# Patient Record
Sex: FEMALE | Race: WHITE | NOT HISPANIC OR LATINO | URBAN - METROPOLITAN AREA
[De-identification: names, ages, dates, MRNs, and addresses within clinical notes are randomized per-mention and may not be internally consistent; named-entity substitution may affect disease eponyms.]

---

## 2019-06-01 ENCOUNTER — EMERGENCY (EMERGENCY)
Facility: HOSPITAL | Age: 44
LOS: 1 days | Discharge: HOME | End: 2019-06-01
Attending: EMERGENCY MEDICINE | Admitting: EMERGENCY MEDICINE
Payer: MEDICAID

## 2019-06-01 VITALS
WEIGHT: 162.04 LBS | OXYGEN SATURATION: 97 % | RESPIRATION RATE: 16 BRPM | TEMPERATURE: 98 F | SYSTOLIC BLOOD PRESSURE: 111 MMHG | HEIGHT: 64 IN | HEART RATE: 77 BPM | DIASTOLIC BLOOD PRESSURE: 80 MMHG

## 2019-06-01 DIAGNOSIS — R05 COUGH: ICD-10-CM

## 2019-06-01 DIAGNOSIS — J40 BRONCHITIS, NOT SPECIFIED AS ACUTE OR CHRONIC: ICD-10-CM

## 2019-06-01 PROCEDURE — 71046 X-RAY EXAM CHEST 2 VIEWS: CPT | Mod: 26

## 2019-06-01 PROCEDURE — 99283 EMERGENCY DEPT VISIT LOW MDM: CPT

## 2019-06-01 RX ORDER — AZITHROMYCIN 500 MG/1
500 TABLET, FILM COATED ORAL ONCE
Refills: 0 | Status: COMPLETED | OUTPATIENT
Start: 2019-06-01 | End: 2019-06-01

## 2019-06-01 RX ORDER — AZITHROMYCIN 500 MG/1
1 TABLET, FILM COATED ORAL
Qty: 4 | Refills: 0
Start: 2019-06-01 | End: 2019-06-04

## 2019-06-01 NOTE — ED PROVIDER NOTE - NSFOLLOWUPINSTRUCTIONS_ED_ALL_ED_FT
take medications as directed     follow up with your doctor as needed     retun to the ER if symptoms worsen

## 2019-06-01 NOTE — ED PROVIDER NOTE - CLINICAL SUMMARY MEDICAL DECISION MAKING FREE TEXT BOX
pt felt generally well throughout ed stay  tolerating po  alert and cooperative and ambulatory dc home with

## 2019-06-01 NOTE — ED ADULT TRIAGE NOTE - CHIEF COMPLAINT QUOTE
walk in c/o unproductive cough x2weeks, denies any other cold symptoms or fever. " the coughing gives me a HA"

## 2019-06-02 VITALS
DIASTOLIC BLOOD PRESSURE: 76 MMHG | RESPIRATION RATE: 15 BRPM | TEMPERATURE: 98 F | SYSTOLIC BLOOD PRESSURE: 110 MMHG | HEART RATE: 80 BPM | OXYGEN SATURATION: 96 %

## 2019-06-02 RX ADMIN — AZITHROMYCIN 500 MILLIGRAM(S): 500 TABLET, FILM COATED ORAL at 00:01

## 2019-06-05 ENCOUNTER — EMERGENCY (EMERGENCY)
Facility: HOSPITAL | Age: 44
LOS: 1 days | Discharge: HOME | End: 2019-06-05
Attending: EMERGENCY MEDICINE | Admitting: EMERGENCY MEDICINE
Payer: MEDICAID

## 2019-06-05 VITALS
RESPIRATION RATE: 18 BRPM | DIASTOLIC BLOOD PRESSURE: 73 MMHG | OXYGEN SATURATION: 97 % | HEART RATE: 68 BPM | WEIGHT: 164.91 LBS | TEMPERATURE: 98 F | SYSTOLIC BLOOD PRESSURE: 113 MMHG

## 2019-06-05 PROCEDURE — 99284 EMERGENCY DEPT VISIT MOD MDM: CPT

## 2019-06-05 RX ORDER — IPRATROPIUM/ALBUTEROL SULFATE 18-103MCG
3 AEROSOL WITH ADAPTER (GRAM) INHALATION
Refills: 0 | Status: COMPLETED | OUTPATIENT
Start: 2019-06-05 | End: 2019-06-05

## 2019-06-05 RX ORDER — CETIRIZINE HYDROCHLORIDE 10 MG/1
1 TABLET ORAL
Qty: 30 | Refills: 0
Start: 2019-06-05 | End: 2019-07-04

## 2019-06-05 RX ORDER — ALBUTEROL 90 UG/1
2 AEROSOL, METERED ORAL
Qty: 1 | Refills: 0
Start: 2019-06-05 | End: 2019-07-04

## 2019-06-05 RX ADMIN — Medication 60 MILLIGRAM(S): at 10:04

## 2019-06-05 RX ADMIN — Medication 3 MILLILITER(S): at 10:01

## 2019-06-05 RX ADMIN — Medication 100 MILLIGRAM(S): at 10:05

## 2019-06-05 NOTE — ED ADULT TRIAGE NOTE - CHIEF COMPLAINT QUOTE
here for productive cough x 2 weeks - was placed on abx with no relief- son is here with same complaints

## 2019-06-05 NOTE — ED PROVIDER NOTE - NSFOLLOWUPINSTRUCTIONS_ED_ALL_ED_FT
Please follow up with your PMD as needed.    I suspect asthma like symptoms from your recent cold (bronchitis).     If symptoms continue consider adding an allergy medication like cetrizine (I have prescribed it).   Return to the ER for increased trouble breathing.     Cough    Coughing is a reflex that clears your throat and your airways. Coughing helps to heal and protect your lungs. It is normal to cough occasionally, but a cough that happens with other symptoms or lasts a long time may be a sign of a condition that needs treatment. Coughing may be caused by infections, asthma or COPD, smoking, postnasal drip, gastroesophageal reflux, as well as other medical conditions. Take medicines only as instructed by your health care provider. Avoid environments or triggers that causes you to cough at work or at home.    SEEK IMMEDIATE MEDICAL CARE IF YOU HAVE ANY OF THE FOLLOWING SYMPTOMS: coughing up blood, shortness of breath, rapid heart rate, chest pain, unexplained weight loss or night sweats.

## 2019-06-05 NOTE — ED PROVIDER NOTE - CLINICAL SUMMARY MEDICAL DECISION MAKING FREE TEXT BOX
Pt presenting with persistent cough following URI. Will give trial of albuterol, prednisone, and Tessalon Perles.

## 2019-06-09 DIAGNOSIS — J40 BRONCHITIS, NOT SPECIFIED AS ACUTE OR CHRONIC: ICD-10-CM

## 2019-06-09 DIAGNOSIS — R05 COUGH: ICD-10-CM

## 2020-08-16 ENCOUNTER — EMERGENCY (EMERGENCY)
Facility: HOSPITAL | Age: 45
LOS: 1 days | Discharge: HOME | End: 2020-08-16
Attending: EMERGENCY MEDICINE | Admitting: EMERGENCY MEDICINE
Payer: MEDICAID

## 2020-08-16 VITALS
RESPIRATION RATE: 19 BRPM | DIASTOLIC BLOOD PRESSURE: 82 MMHG | WEIGHT: 164.91 LBS | OXYGEN SATURATION: 98 % | HEART RATE: 73 BPM | HEIGHT: 64 IN | SYSTOLIC BLOOD PRESSURE: 120 MMHG | TEMPERATURE: 99 F

## 2020-08-16 LAB — S PYO AG SPEC QL IA: NEGATIVE — SIGNIFICANT CHANGE UP

## 2020-08-16 PROCEDURE — 99283 EMERGENCY DEPT VISIT LOW MDM: CPT

## 2020-08-16 RX ORDER — IBUPROFEN 200 MG
600 TABLET ORAL ONCE
Refills: 0 | Status: COMPLETED | OUTPATIENT
Start: 2020-08-16 | End: 2020-08-16

## 2020-08-16 RX ORDER — IBUPROFEN 200 MG
1 TABLET ORAL
Qty: 20 | Refills: 0
Start: 2020-08-16

## 2020-08-16 RX ADMIN — Medication 600 MILLIGRAM(S): at 16:43

## 2020-08-16 NOTE — ED PROVIDER NOTE - PATIENT PORTAL LINK FT
You can access the FollowMyHealth Patient Portal offered by [facility] by registering at the following website: [email or web address]. By joining [employer]’s FollowMyHealth portal, you will also be able to view your health information using other applications (apps) compatible with our system.

## 2020-08-16 NOTE — ED ADULT TRIAGE NOTE - CHIEF COMPLAINT QUOTE
pt c/o L sided sore throat which has radiated to her L ear x1 day. denies fevers, cough, hearing changes

## 2020-08-16 NOTE — ED PROVIDER NOTE - OBJECTIVE STATEMENT
Pt is a 43yo F who reports L sided throat pain that radiates to her L ear.  She reports pain is dull and achy and started yesterday.  She reports it feels like she has an ear infection.  Pain exacerbated by swallowing.  No cough.  No fever or chills.  No lumps/bumps to neck.  Denies other concerns.

## 2020-08-16 NOTE — ED ADULT NURSE NOTE - PMH
Patient called stating she was prescribed for infection in her mouth.  Patient started Augmentin last night.  She states about 4 hours after she started vomiting and had diarrhea.  This morning she took it with food and about 5 hours later vomited.  Patient states she has taken this in the past and does not recall any problems.  Patient is wondering what Dr. Rubio recommends    Call pt [phone]   Sciatica

## 2020-08-16 NOTE — ED PROVIDER NOTE - CLINICAL SUMMARY MEDICAL DECISION MAKING FREE TEXT BOX
Likely viral pharyngitis.  Will send rapid strep to investigate for bacterial possibility.  Motrin for pain.

## 2020-08-16 NOTE — ED PROVIDER NOTE - PHYSICAL EXAMINATION
VITAL SIGNS: I have reviewed nursing notes and confirm.  CONSTITUTIONAL: Well-developed; well-nourished; in no acute distress.  SKIN: Skin is warm and dry, no acute rash.  HEAD: Normocephalic; atraumatic.  EYES: PERRL, EOM intact; conjunctiva and sclera clear.  ENT: No nasal discharge; airway clear without visible tonsils or exudates.  No significant erythema.  TM - clear b/l with normal cone of light.  No erythema.  No tragal tenderness.   NECK: Supple; non tender; no LAD.  CARD: S1, S2 normal; no murmurs, gallops, or rubs. Regular rate and rhythm.  RESP: No wheezes, rales or rhonchi.  ABD: Normal bowel sounds; soft; non-distended; non-tender; no hepatosplenomegaly.  MSK: Normal ROM. No clubbing, cyanosis or edema.  NEURO: Alert, oriented. Grossly unremarkable.  PSYCH: Cooperative, appropriate.

## 2020-08-18 LAB
CULTURE RESULTS: SIGNIFICANT CHANGE UP
SPECIMEN SOURCE: SIGNIFICANT CHANGE UP

## 2020-08-20 DIAGNOSIS — J02.9 ACUTE PHARYNGITIS, UNSPECIFIED: ICD-10-CM

## 2021-10-28 NOTE — ED PROVIDER NOTE - OBJECTIVE STATEMENT
Pt called and stated he has a prescription need that his wellness prescription plan doesn't cover. Pt also stated that [employer] will be faxing a request for a medical prescription exeemption over. Please advise. 42 y/o female with no pertinent PMHx, NKDA, presents to the ED c/o URI with persistent cough x 2 weeks. Pt describes her cough as moist and hacking, worse at night. She states she has been alternating between Mucinex and Robitussin with some relief of her symptoms. Pt was in the ER x 3 days ago where she was prescribed a Z aissatou which she is finishing today. Pt states the Z aissatou has not improved her symptoms at all. She denies fever/chills, no other medical complaints at this time.

## 2022-04-07 ENCOUNTER — EMERGENCY (EMERGENCY)
Facility: HOSPITAL | Age: 47
LOS: 1 days | Discharge: HOME | End: 2022-04-07
Admitting: EMERGENCY MEDICINE
Payer: MEDICAID

## 2022-04-07 VITALS
DIASTOLIC BLOOD PRESSURE: 85 MMHG | OXYGEN SATURATION: 94 % | WEIGHT: 164.91 LBS | HEART RATE: 75 BPM | HEIGHT: 64 IN | SYSTOLIC BLOOD PRESSURE: 130 MMHG | RESPIRATION RATE: 18 BRPM | TEMPERATURE: 99 F

## 2022-04-07 VITALS
TEMPERATURE: 98 F | DIASTOLIC BLOOD PRESSURE: 72 MMHG | HEART RATE: 66 BPM | RESPIRATION RATE: 18 BRPM | SYSTOLIC BLOOD PRESSURE: 108 MMHG | OXYGEN SATURATION: 98 %

## 2022-04-07 DIAGNOSIS — U07.1 COVID-19: ICD-10-CM

## 2022-04-07 DIAGNOSIS — J06.9 ACUTE UPPER RESPIRATORY INFECTION, UNSPECIFIED: ICD-10-CM

## 2022-04-07 DIAGNOSIS — N39.0 URINARY TRACT INFECTION, SITE NOT SPECIFIED: ICD-10-CM

## 2022-04-07 DIAGNOSIS — R10.9 UNSPECIFIED ABDOMINAL PAIN: ICD-10-CM

## 2022-04-07 LAB
ALBUMIN SERPL ELPH-MCNC: 3.8 G/DL — SIGNIFICANT CHANGE UP (ref 3.4–5)
ALP SERPL-CCNC: 197 U/L — HIGH (ref 40–120)
ALT FLD-CCNC: 385 U/L — HIGH (ref 12–42)
ANION GAP SERPL CALC-SCNC: 7 MMOL/L — LOW (ref 9–16)
APPEARANCE UR: CLEAR — SIGNIFICANT CHANGE UP
APPEARANCE UR: CLEAR — SIGNIFICANT CHANGE UP
APTT BLD: 35.4 SEC — SIGNIFICANT CHANGE UP (ref 27.5–35.5)
AST SERPL-CCNC: 142 U/L — HIGH (ref 15–37)
BACTERIA # UR AUTO: PRESENT /HPF
BACTERIA # UR AUTO: PRESENT /HPF
BASOPHILS # BLD AUTO: 0.04 K/UL — SIGNIFICANT CHANGE UP (ref 0–0.2)
BASOPHILS NFR BLD AUTO: 0.5 % — SIGNIFICANT CHANGE UP (ref 0–2)
BILIRUB SERPL-MCNC: 2 MG/DL — HIGH (ref 0.2–1.2)
BILIRUB UR-MCNC: ABNORMAL
BILIRUB UR-MCNC: ABNORMAL
BUN SERPL-MCNC: 12 MG/DL — SIGNIFICANT CHANGE UP (ref 7–23)
CALCIUM SERPL-MCNC: 9 MG/DL — SIGNIFICANT CHANGE UP (ref 8.5–10.5)
CHLORIDE SERPL-SCNC: 104 MMOL/L — SIGNIFICANT CHANGE UP (ref 96–108)
CO2 SERPL-SCNC: 30 MMOL/L — SIGNIFICANT CHANGE UP (ref 22–31)
COD CRY URNS QL: ABNORMAL /HPF
COLOR SPEC: YELLOW — SIGNIFICANT CHANGE UP
COLOR SPEC: YELLOW — SIGNIFICANT CHANGE UP
CREAT SERPL-MCNC: 0.87 MG/DL — SIGNIFICANT CHANGE UP (ref 0.5–1.3)
DIFF PNL FLD: NEGATIVE — SIGNIFICANT CHANGE UP
DIFF PNL FLD: NEGATIVE — SIGNIFICANT CHANGE UP
EGFR: 83 ML/MIN/1.73M2 — SIGNIFICANT CHANGE UP
EOSINOPHIL # BLD AUTO: 0.33 K/UL — SIGNIFICANT CHANGE UP (ref 0–0.5)
EOSINOPHIL NFR BLD AUTO: 4.3 % — SIGNIFICANT CHANGE UP (ref 0–6)
EPI CELLS # UR: ABNORMAL /HPF (ref 0–5)
EPI CELLS # UR: ABNORMAL /HPF (ref 0–5)
GLUCOSE SERPL-MCNC: 143 MG/DL — HIGH (ref 70–99)
GLUCOSE UR QL: NEGATIVE — SIGNIFICANT CHANGE UP
GLUCOSE UR QL: NEGATIVE — SIGNIFICANT CHANGE UP
HCT VFR BLD CALC: 42.1 % — SIGNIFICANT CHANGE UP (ref 34.5–45)
HGB BLD-MCNC: 14.2 G/DL — SIGNIFICANT CHANGE UP (ref 11.5–15.5)
IMM GRANULOCYTES NFR BLD AUTO: 0.3 % — SIGNIFICANT CHANGE UP (ref 0–1.5)
INR BLD: 1.01 — SIGNIFICANT CHANGE UP (ref 0.88–1.16)
KETONES UR-MCNC: ABNORMAL MG/DL
KETONES UR-MCNC: ABNORMAL MG/DL
LEUKOCYTE ESTERASE UR-ACNC: ABNORMAL
LEUKOCYTE ESTERASE UR-ACNC: ABNORMAL
LYMPHOCYTES # BLD AUTO: 0.91 K/UL — LOW (ref 1–3.3)
LYMPHOCYTES # BLD AUTO: 11.9 % — LOW (ref 13–44)
MAGNESIUM SERPL-MCNC: 2 MG/DL — SIGNIFICANT CHANGE UP (ref 1.6–2.6)
MCHC RBC-ENTMCNC: 31.1 PG — SIGNIFICANT CHANGE UP (ref 27–34)
MCHC RBC-ENTMCNC: 33.7 GM/DL — SIGNIFICANT CHANGE UP (ref 32–36)
MCV RBC AUTO: 92.3 FL — SIGNIFICANT CHANGE UP (ref 80–100)
MONOCYTES # BLD AUTO: 0.89 K/UL — SIGNIFICANT CHANGE UP (ref 0–0.9)
MONOCYTES NFR BLD AUTO: 11.6 % — SIGNIFICANT CHANGE UP (ref 2–14)
NEUTROPHILS # BLD AUTO: 5.47 K/UL — SIGNIFICANT CHANGE UP (ref 1.8–7.4)
NEUTROPHILS NFR BLD AUTO: 71.4 % — SIGNIFICANT CHANGE UP (ref 43–77)
NITRITE UR-MCNC: NEGATIVE — SIGNIFICANT CHANGE UP
NITRITE UR-MCNC: NEGATIVE — SIGNIFICANT CHANGE UP
NRBC # BLD: 0 /100 WBCS — SIGNIFICANT CHANGE UP (ref 0–0)
PH UR: 6.5 — SIGNIFICANT CHANGE UP (ref 5–8)
PH UR: 6.5 — SIGNIFICANT CHANGE UP (ref 5–8)
PLATELET # BLD AUTO: 230 K/UL — SIGNIFICANT CHANGE UP (ref 150–400)
POTASSIUM SERPL-MCNC: 4 MMOL/L — SIGNIFICANT CHANGE UP (ref 3.5–5.3)
POTASSIUM SERPL-SCNC: 4 MMOL/L — SIGNIFICANT CHANGE UP (ref 3.5–5.3)
PROT SERPL-MCNC: 7.3 G/DL — SIGNIFICANT CHANGE UP (ref 6.4–8.2)
PROT UR-MCNC: ABNORMAL MG/DL
PROT UR-MCNC: ABNORMAL MG/DL
PROTHROM AB SERPL-ACNC: 11.7 SEC — SIGNIFICANT CHANGE UP (ref 10.5–13.4)
RAPID RVP RESULT: DETECTED
RBC # BLD: 4.56 M/UL — SIGNIFICANT CHANGE UP (ref 3.8–5.2)
RBC # FLD: 13.4 % — SIGNIFICANT CHANGE UP (ref 10.3–14.5)
RBC CASTS # UR COMP ASSIST: < 5 /HPF — SIGNIFICANT CHANGE UP
RBC CASTS # UR COMP ASSIST: < 5 /HPF — SIGNIFICANT CHANGE UP
SARS-COV-2 RNA SPEC QL NAA+PROBE: DETECTED
SODIUM SERPL-SCNC: 141 MMOL/L — SIGNIFICANT CHANGE UP (ref 132–145)
SP GR SPEC: 1.02 — SIGNIFICANT CHANGE UP (ref 1–1.03)
SP GR SPEC: >=1.03 — SIGNIFICANT CHANGE UP (ref 1–1.03)
UROBILINOGEN FLD QL: 4 E.U./DL
UROBILINOGEN FLD QL: 4 E.U./DL
WBC # BLD: 7.66 K/UL — SIGNIFICANT CHANGE UP (ref 3.8–10.5)
WBC # FLD AUTO: 7.66 K/UL — SIGNIFICANT CHANGE UP (ref 3.8–10.5)
WBC UR QL: ABNORMAL /HPF
WBC UR QL: ABNORMAL /HPF

## 2022-04-07 PROCEDURE — 99284 EMERGENCY DEPT VISIT MOD MDM: CPT

## 2022-04-07 RX ORDER — CEFUROXIME AXETIL 250 MG
250 TABLET ORAL EVERY 12 HOURS
Refills: 0 | Status: DISCONTINUED | OUTPATIENT
Start: 2022-04-07 | End: 2022-04-11

## 2022-04-07 RX ORDER — KETOROLAC TROMETHAMINE 30 MG/ML
15 SYRINGE (ML) INJECTION ONCE
Refills: 0 | Status: DISCONTINUED | OUTPATIENT
Start: 2022-04-07 | End: 2022-04-07

## 2022-04-07 RX ORDER — SODIUM CHLORIDE 9 MG/ML
1000 INJECTION INTRAMUSCULAR; INTRAVENOUS; SUBCUTANEOUS ONCE
Refills: 0 | Status: COMPLETED | OUTPATIENT
Start: 2022-04-07 | End: 2022-04-07

## 2022-04-07 RX ORDER — CEFUROXIME AXETIL 250 MG
1 TABLET ORAL
Qty: 20 | Refills: 0
Start: 2022-04-07 | End: 2022-04-16

## 2022-04-07 RX ADMIN — SODIUM CHLORIDE 1000 MILLILITER(S): 9 INJECTION INTRAMUSCULAR; INTRAVENOUS; SUBCUTANEOUS at 18:28

## 2022-04-07 RX ADMIN — Medication 250 MILLIGRAM(S): at 18:21

## 2022-04-07 RX ADMIN — Medication 200 MILLIGRAM(S): at 16:01

## 2022-04-07 RX ADMIN — SODIUM CHLORIDE 1000 MILLILITER(S): 9 INJECTION INTRAMUSCULAR; INTRAVENOUS; SUBCUTANEOUS at 16:01

## 2022-04-07 RX ADMIN — Medication 15 MILLIGRAM(S): at 16:00

## 2022-04-07 NOTE — ED PROVIDER NOTE - NSFOLLOWUPINSTRUCTIONS_ED_ALL_ED_FT
Viral Respiratory Infection    A viral respiratory infection is an illness that affects parts of the body used for breathing, like the lungs, nose, and throat. It is caused by a germ called a virus. Symptoms can include runny nose, coughing, sneezing, fatigue, body aches, sore throat, fever, or headache. Over the counter medicine can be used to manage the symptoms but the infection typically goes away on its own in 5 to 10 days.     SEEK IMMEDIATE MEDICAL CARE IF YOU HAVE ANY OF THE FOLLOWING SYMPTOMS: shortness of breath, chest pain, fever over 10 days, or lightheadedness/dizziness.         Urinary Tract Infection    A urinary tract infection (UTI) is an infection of any part of the urinary tract, which includes the kidneys, ureters, bladder, and urethra. Risk factors include ignoring your need to urinate, wiping back to front if female, being an uncircumcised male, and having diabetes or a weak immune system. Symptoms include frequent urination, pain or burning with urination, foul smelling urine, cloudy urine, pain in the lower abdomen, blood in the urine, and fever. If you were prescribed an antibiotic medicine, take it as told by your health care provider. Do not stop taking the antibiotic even if you start to feel better.    SEEK IMMEDIATE MEDICAL CARE IF YOU HAVE ANY OF THE FOLLOWING SYMPTOMS: severe back or abdominal pain, fever, inability to keep fluids or medicine down, dizziness/lightheadedness, or a change in mental status.     TAKE ANTIBIOTICS AS PRESCRIBED UNTIL COMPLETE.   TAKE OVER THE COUNTER COLD MEDICATIONS FOR SYMPTOMATIC TREATMENT    -PLEASE FOLLOW-UP WITH YOUR PRIMARY CARE DOCTOR IN 1-2 DAYS.  BRING ALL PAPERWORK FROM TODAY'S VISIT TO YOUR FOLLOW-UP VISIT.  IF YOU DO NOT HAVE A PRIMARY CARE DOCTOR PLEASE REFER TO THE OFFICE/CLINIC INFORMATION GIVEN ABOVE.  YOU MAY ALSO CALL [phone] AND ASK FOR MS. THEO DESAI.  SHE CAN HELP YOU MAKE A FOLLOW-UP APPOINTMENT.  HER HOURS ARE 9AM-5PM MONDAY - FRIDAY.  -TAKE OVER THE COUNTER TYLENOL 650MG BY MOUTH EVERY 4-6 HOURS AS NEEDED FOR PAIN.  DO NOT MIX WITH ALCOHOL OR OTHER PRESCRIPTION MEDICATIONS THAT ALREADY CONTAIN TYLENOL OR ACETAMINOPHEN.   -TAKE OVER THE COUNTER IBUPROFEN 400-600MG BY MOUTH EVERY 8 HOURS AS NEEDED FOR PAIN.  BE SURE TO TAKE WITH FOOD OR MILK AS THIS MEDICATION CAN CAUSE STOMACH IRRITATION.  -PLEASE RETURN TO THE EMERGENCY DEPARTMENT IMMEDIATELY OR CALL [phone] FOR ANY HIGH FEVER, TROUBLE BREATHING, VOMITING, SEVERE PAIN, OR ANY OTHER CONCERNS.

## 2022-04-07 NOTE — ED PROVIDER NOTE - CLINICAL SUMMARY MEDICAL DECISION MAKING FREE TEXT BOX
45 y/o F presents to ED c/o URI symptoms with associated viral syndrome.  Pt well appearing, VSS, afebrile.  Labs notable for mild transaminitis can also be associated with URI.  Pt hydrated with IVFs and given Toradol, Tessalon Perles and Hycodan.  RVP sent.  Initial UA contaminated.  Rpt UA still shows leuks and bacteruria. Will treat given c/o lower back pain with Ceftin.    Results and diagnosis discussed with patient.  Treatment plan discussed; symptom management and abx for UTI.  Return precautions discussed.  Pt verbalized understanding and given the opportunity to ask questions.  Patient advised to follow up with primary care provider.

## 2022-04-07 NOTE — ED PROVIDER NOTE - PATIENT PORTAL LINK FT
You can access the FollowMyHealth Patient Portal offered by [facility] by registering at the following website: [email or web address]. By joining [employer]’s FollowMyHealth portal, you will also be able to view your health information using other applications (apps) compatible with our system.

## 2022-04-07 NOTE — ED PROVIDER NOTE - OBJECTIVE STATEMENT
45 y/o female with no PMHx presents complaining of diffuse abdominal cramping with associated mid back aching for the past 5 days. Patient also complaining of nasal congestion, cough, sore throat, body aches, and subjective fever for the past 2-3 days. Patient has been taking tylenols and Mucinex for symptoms. Patient is vaccinated and boosted against COVID-19 and the flu. Patient denies smoking. Denies chest pain, shortness of breath, nausea, vomiting, diarrhea, dysuria, or hematuria. Denies sick contacts.

## 2022-04-09 LAB
CULTURE RESULTS: SIGNIFICANT CHANGE UP
SPECIMEN SOURCE: SIGNIFICANT CHANGE UP

## 2024-02-12 ENCOUNTER — EMERGENCY (EMERGENCY)
Facility: HOSPITAL | Age: 49
LOS: 1 days | Discharge: HOME | End: 2024-02-12
Attending: EMERGENCY MEDICINE | Admitting: EMERGENCY MEDICINE
Payer: MEDICAID

## 2024-02-12 VITALS
HEART RATE: 82 BPM | TEMPERATURE: 100 F | RESPIRATION RATE: 18 BRPM | SYSTOLIC BLOOD PRESSURE: 116 MMHG | WEIGHT: 164.91 LBS | OXYGEN SATURATION: 95 % | DIASTOLIC BLOOD PRESSURE: 75 MMHG

## 2024-02-12 LAB
FLUAV AG NPH QL: SIGNIFICANT CHANGE UP
FLUBV AG NPH QL: SIGNIFICANT CHANGE UP
RSV RNA NPH QL NAA+NON-PROBE: SIGNIFICANT CHANGE UP
S PYO AG SPEC QL IA: NEGATIVE — SIGNIFICANT CHANGE UP
SARS-COV-2 RNA SPEC QL NAA+PROBE: SIGNIFICANT CHANGE UP

## 2024-02-12 PROCEDURE — 71046 X-RAY EXAM CHEST 2 VIEWS: CPT | Mod: 26

## 2024-02-12 PROCEDURE — 99284 EMERGENCY DEPT VISIT MOD MDM: CPT

## 2024-02-12 RX ORDER — IBUPROFEN 200 MG
600 TABLET ORAL ONCE
Refills: 0 | Status: COMPLETED | OUTPATIENT
Start: 2024-02-12 | End: 2024-02-12

## 2024-02-12 RX ORDER — DEXAMETHASONE 0.5 MG/5ML
10 ELIXIR ORAL ONCE
Refills: 0 | Status: COMPLETED | OUTPATIENT
Start: 2024-02-12 | End: 2024-02-12

## 2024-02-12 RX ORDER — ACETAMINOPHEN 500 MG
650 TABLET ORAL ONCE
Refills: 0 | Status: COMPLETED | OUTPATIENT
Start: 2024-02-12 | End: 2024-02-12

## 2024-02-12 RX ADMIN — Medication 600 MILLIGRAM(S): at 20:33

## 2024-02-12 RX ADMIN — Medication 10 MILLIGRAM(S): at 21:18

## 2024-02-12 RX ADMIN — Medication 650 MILLIGRAM(S): at 20:34

## 2024-02-12 NOTE — ED PROVIDER NOTE - PHYSICAL EXAMINATION
PE: A&Ox3, well appearing, NAD, NCAT, oropharynx clear without exudates/erythema/swelling, regular respiratory effort ctab, rrr, moving all four extremities equally.

## 2024-02-12 NOTE — ED PROVIDER NOTE - PATIENT PORTAL LINK FT
You can access the FollowMyHealth Patient Portal offered by [facility] by registering at the following website: [email or web address]. By joining [employer]’s FollowMyHealth portal, you will also be able to view your health information using other applications (apps) compatible with our system.

## 2024-02-12 NOTE — ED PROVIDER NOTE - CLINICAL SUMMARY MEDICAL DECISION MAKING FREE TEXT BOX
48F history of migraines  Patient presents with 2-3 days of sore throat, cough, and headaches. Patient denies overt fevers/chills, nausea/vomiting, congestion, chest pain, shortness of breath, abdominal pain, bowel/bladder habit changes. Took an at-home covid test that was negative yesterday. Taking mucinex for her symptoms.    PE: A&Ox3, well appearing, NAD, NCAT, oropharynx clear without exudates/erythema/swelling, regular respiratory effort ctab, rrr, moving all four extremities equally.     MDM: Patient presents with symptoms consistent with viral URI. Will swab and send rapid strep. Meds for symptom management.

## 2024-02-12 NOTE — ED PROVIDER NOTE - NSFOLLOWUPINSTRUCTIONS_ED_ALL_ED_FT
70
Your testing today was negative for COVID/influenza/RSV, strep throat and pneumonia. It is likely that you have a "common cold" virus which typically lasts 5-10 days. Please read all handouts provided to you from the emergency department.  Seek immediate medical attention for any new/worsening signs or symptoms.  You may take ibuprofen 600 mg every 6 hours and/or acetaminophen 650 mg every 4-6 hours as needed for pain.    To access your record on the patient portal [facility], please visit:  [email or web address]  If you are having difficulties setting this up, call [phone] and someone can assist you over the phone.     Sore Throat    A sore throat is pain, burning, irritation, or scratchiness in the throat. When you have a sore throat, you may feel pain or tenderness in your throat when you swallow or talk.    Many things can cause a sore throat, including:    An infection.  Seasonal allergies.  Dryness in the air.  Irritants, such as smoke or pollution.  Gastroesophageal reflux disease (GERD).  A tumor.    A sore throat is often the first sign of another sickness. It may happen with other symptoms, such as coughing, sneezing, fever, and swollen neck glands. Most sore throats go away without medical treatment.     HOME CARE INSTRUCTIONS  Take over-the-counter medicines only as told by your health care provider.  Drink enough fluids to keep your urine clear or pale yellow.  Rest as needed.  To help with pain, try:  Sipping warm liquids, such as broth, herbal tea, or warm water.  Eating or drinking cold or frozen liquids, such as frozen ice pops.  Gargling with a salt-water mixture 3–4 times a day or as needed. To make a salt-water mixture, completely dissolve ½–1 tsp of salt in 1 cup of warm water.  Sucking on hard candy or throat lozenges.  Putting a cool-mist humidifier in your bedroom at night to moisten the air.  Sitting in the bathroom with the door closed for 5–10 minutes while you run hot water in the shower.  Do not use any tobacco products, such as cigarettes, chewing tobacco, and e-cigarettes. If you need help quitting, ask your health care provider.    SEEK MEDICAL CARE IF:  You have a fever for more than 2–3 days.  You have symptoms that last (are persistent) for more than 2–3 days.  Your throat does not get better within 7 days.  You have a fever and your symptoms suddenly get worse.    SEEK IMMEDIATE MEDICAL CARE IF:  You have difficulty breathing.  You cannot swallow fluids, soft foods, or your saliva.  You have increased swelling in your throat or neck.  You have persistent nausea and vomiting.    ADDITIONAL NOTES AND INSTRUCTIONS    Please follow up with your Primary MD in 24-48 hr.  Seek immediate medical care for any new/worsening signs or symptoms.

## 2024-02-12 NOTE — ED PROVIDER NOTE - OBJECTIVE STATEMENT
48F history of migraines  Patient presents with 2-3 days of sore throat, cough, and headaches. Patient denies overt fevers/chills, nausea/vomiting, congestion, chest pain, shortness of breath, abdominal pain, bowel/bladder habit changes. Took an at-home covid test that was negative yesterday. Taking mucinex for her symptoms.

## 2024-02-15 LAB
CULTURE RESULTS: SIGNIFICANT CHANGE UP
SPECIMEN SOURCE: SIGNIFICANT CHANGE UP

## 2024-02-16 DIAGNOSIS — R51.9 HEADACHE, UNSPECIFIED: ICD-10-CM

## 2024-02-16 DIAGNOSIS — J02.9 ACUTE PHARYNGITIS, UNSPECIFIED: ICD-10-CM

## 2024-02-16 DIAGNOSIS — R05.9 COUGH, UNSPECIFIED: ICD-10-CM

## 2024-02-16 DIAGNOSIS — Z86.69 PERSONAL HISTORY OF OTHER DISEASES OF THE NERVOUS SYSTEM AND SENSE ORGANS: ICD-10-CM

## 2024-02-16 DIAGNOSIS — Z20.822 CONTACT WITH AND (SUSPECTED) EXPOSURE TO COVID-19: ICD-10-CM

## 2024-03-31 ENCOUNTER — EMERGENCY (EMERGENCY)
Facility: HOSPITAL | Age: 49
LOS: 1 days | Discharge: HOME | End: 2024-03-31
Attending: EMERGENCY MEDICINE | Admitting: EMERGENCY MEDICINE
Payer: MEDICAID

## 2024-03-31 VITALS
RESPIRATION RATE: 20 BRPM | TEMPERATURE: 98 F | OXYGEN SATURATION: 96 % | SYSTOLIC BLOOD PRESSURE: 147 MMHG | DIASTOLIC BLOOD PRESSURE: 85 MMHG | HEIGHT: 64 IN | HEART RATE: 78 BPM | WEIGHT: 164.91 LBS

## 2024-03-31 PROCEDURE — 73130 X-RAY EXAM OF HAND: CPT | Mod: 26,LT

## 2024-03-31 PROCEDURE — 99284 EMERGENCY DEPT VISIT MOD MDM: CPT

## 2024-03-31 RX ORDER — IBUPROFEN 200 MG
600 TABLET ORAL ONCE
Refills: 0 | Status: COMPLETED | OUTPATIENT
Start: 2024-03-31 | End: 2024-03-31

## 2024-03-31 RX ORDER — IBUPROFEN 200 MG
1 TABLET ORAL
Qty: 28 | Refills: 0
Start: 2024-03-31 | End: 2024-04-06

## 2024-03-31 RX ADMIN — Medication 600 MILLIGRAM(S): at 21:01

## 2024-03-31 NOTE — ED PROVIDER NOTE - CLINICAL SUMMARY MEDICAL DECISION MAKING FREE TEXT BOX
X-ray negative for acute bony pathology.  No dislocation.  Hemodynamically intact.  No evidence of nerve injury.  Pain improved with oral ibuprofen.  Will discharge home with hand follow-up should patient have persistent pain.

## 2024-03-31 NOTE — ED ADULT NURSE NOTE - OBJECTIVE STATEMENT
pt reports left hand injury after accidental cut from ; noted swelling on left fingers with tiny cut on distal part of 2nd finger; no active bleeding at this time; pt able to move all fingers on left hand; distal CSM intact with CRT <2secs on bilateral hands; pt alert & oriented x4, in no acute distress; pt states up-to-date with tetanus immunization

## 2024-03-31 NOTE — ED ADULT TRIAGE NOTE - CHIEF COMPLAINT QUOTE
Pt walked into ER with SO c/o pain to left hand. Pt states she was changing the blades on hand  when it turned on suddenly causing injury to fingers and top of left hand. No bleeding at triage, observed swelling to hand and fingers. +PMS, +ROM. Pt took 1000mg of Tylenol PTA. NKDA/-PMH.

## 2024-03-31 NOTE — ED PROVIDER NOTE - CARE PROVIDER_API CALL
[facility]  Plastic Surgery  [address] [place] [id number]  Phone: [phone]  Fax: [phone]  Follow Up Time:

## 2024-03-31 NOTE — ED PROVIDER NOTE - PHYSICAL EXAMINATION
CONSTITUTIONAL: Well appearing, well nourished, awake, alert, oriented to person, place, time/situation and in no apparent distress.  ENT: Airway patent, Nasal mucosa clear. Mouth with normal mucosa.  EYES: Clear bilaterally.  RESPIRATORY: Breathing comfortably with normal RR.  MSK: Some bruising to proximal second and third digits on the left hand.  No violation of skin.  Distal capillary refill and sensation intact.  Full range of motion intact ago with pain.  Erythema to left thumb without tenderness or ecchymosis.  All other extremity exam is within normal limits.  NEURO: Alert and oriented, no focal deficits.  SKIN: Skin normal color for race, warm, dry and intact. No evidence of rash.  PSYCH: Alert and oriented to person, place, time/situation. normal mood and affect. no apparent risk to self or others.

## 2024-03-31 NOTE — ED PROVIDER NOTE - NSFOLLOWUPINSTRUCTIONS_ED_ALL_ED_FT
Finger Sprain, Adult  A finger sprain is a tear or stretch in a ligament in a finger. Ligaments are tissues that connect bones to each other.    What are the causes?  Finger sprains happen when something makes the bones in the hand move in an abnormal way. They are often caused by a fall or an accident.    What increases the risk?  This condition is more likely to develop in people who:  Participate in sports in which it is easy to fall, such as skiing.  Play sports that involve catching an object, such as basketball.  Have poor strength and flexibility.  What are the signs or symptoms?  Symptoms of this condition include:  Pain or tenderness in the finger.  Swelling in the finger.  A bluish appearance to the finger.  Bruising.  Difficulty bending and flexing the finger.  How is this diagnosed?  This condition is diagnosed with an exam of your finger. Your health care provider may take an X-ray to see if any bones are broken or dislocated.    How is this treated?  Hand showing finger splint on index and middle fingers and wrist.  Treatment for this condition depends on how severe the sprain is. It may involve:  Preventing the finger from moving for a period of time. Your finger may be wrapped in a bandage (dressing) or splint, or your finger may be taped to the fingers beside it (dayana taping).  Medicines for pain.  Exercises to strengthen the finger. These may be recommended when the finger has healed.  Surgery to reconnect the ligament to a bone. This may be done if the ligament was completely torn.  Follow these instructions at home:  If you have a removable splint:    Wear the splint as told by your health care provider. Remove it only as told by your health care provider.  Check the skin around the splint every day. Tell your health care provider about any concerns.  Loosen the splint if your fingers tingle, become numb, or turn cold and blue.  Keep the splint clean.  If the splint is not waterproof:  Do not let it get wet.  Cover it with a watertight covering when you take a bath or shower.  Managing pain, stiffness, and swelling    If directed, put ice on the injured area. To do this:  If you have a removable splint, remove it as told by your health care provider.  Put ice in a plastic bag.  Place a towel between your skin and the bag.  Leave the ice on for 20 minutes, 2–3 times a day.  Remove the ice if your skin turns bright red. This is very important. If you cannot feel pain, heat, or cold, you have a greater risk of damage to the area.  Move your fingers often to reduce stiffness and swelling.  Raise (elevate) the injured area above the level of your heart while you are sitting or lying down.  Medicines    Take over-the-counter and prescription medicines only as told by your health care provider.  Ask your health care provider if the medicine prescribed to you requires you to avoid driving or using machinery.  General instructions    Keep any dressings dry until your health care provider says they can be removed.  If your fingers are dayana taped, replace your dayana taping as told by your health care provider.  Do exercises as told by your health care provider or physical therapist.  Do not wear rings on your injured finger.  Keep all follow-up visits. This is important.  Contact a health care provider if:  Your pain is not controlled with medicine.  Your bruising or swelling gets worse.  Your splint is damaged.  You develop a fever.  Get help right away if:  Your finger is numb or blue.  Your finger feels colder to the touch than normal.  Summary  A finger sprain is a tear or stretch in a ligament in a finger. Ligaments are tissues that connect bones to each other.  Finger sprains happen when something makes the bones in the hand move in an abnormal way. They are often caused by a fall or accident.  This condition is diagnosed with an exam of your finger. Your health care provider may do an X-ray to see if any bones are broken or dislocated.  Treatment for this condition depends on how severe the sprain is. Treatment may involve dayana taping or wearing a splint. Surgery to reconnect the ligament to a bone may be needed if the ligament was torn all the way.  This information is not intended to replace advice given to you by your health care provider. Make sure you discuss any questions you have with your health care provider.

## 2024-03-31 NOTE — ED PROVIDER NOTE - OBJECTIVE STATEMENT
48-year-old female presents with left hand pain after she got her thumb index and middle fingers caught in a  earlier this evening.  Denies bleeding but states that her fingers were twisted in the machine and now it is painful to make a fist and utilize the hand.  Denies numbness or tingling.  No anticoagulation.  Has taken no medication for pain.

## 2024-03-31 NOTE — ED PROVIDER NOTE - PATIENT PORTAL LINK FT
You can access the FollowMyHealth Patient Portal offered by [facility] by registering at the following website: [email or web address]. By joining [employer]’s FollowMyHealth portal, you will also be able to view your health information using other applications (apps) compatible with our system.

## 2024-03-31 NOTE — ED ADULT NURSE NOTE - NSFALLUNIVINTERV_ED_ALL_ED
Bed/Stretcher in lowest position, wheels locked, appropriate side rails in place/Call bell, personal items and telephone in reach/Instruct patient to call for assistance before getting out of bed/chair/stretcher/Non-slip footwear applied when patient is off stretcher/[place] to call system/Physically safe environment - no spills, clutter or unnecessary equipment/Purposeful proactive rounding/Room/bathroom lighting operational, light cord in reach
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
## 2024-04-02 DIAGNOSIS — M79.642 PAIN IN LEFT HAND: ICD-10-CM

## 2024-04-02 DIAGNOSIS — S63.613A UNSPECIFIED SPRAIN OF LEFT MIDDLE FINGER, INITIAL ENCOUNTER: ICD-10-CM
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
## 2024-04-02 DIAGNOSIS — S63.602A UNSPECIFIED SPRAIN OF LEFT THUMB, INITIAL ENCOUNTER: ICD-10-CM

## 2024-04-02 DIAGNOSIS — S63.611A UNSPECIFIED SPRAIN OF LEFT INDEX FINGER, INITIAL ENCOUNTER: ICD-10-CM

## 2024-07-23 ENCOUNTER — EMERGENCY (EMERGENCY)
Facility: HOSPITAL | Age: 49
LOS: 1 days | Discharge: HOME | End: 2024-07-23
Admitting: EMERGENCY MEDICINE
Payer: MEDICAID

## 2024-07-23 VITALS
HEART RATE: 84 BPM | OXYGEN SATURATION: 99 % | RESPIRATION RATE: 18 BRPM | HEIGHT: 65 IN | WEIGHT: 160.06 LBS | SYSTOLIC BLOOD PRESSURE: 116 MMHG | DIASTOLIC BLOOD PRESSURE: 78 MMHG | TEMPERATURE: 98 F

## 2024-07-23 DIAGNOSIS — Z87.39 PERSONAL HISTORY OF OTHER DISEASES OF THE MUSCULOSKELETAL SYSTEM AND CONNECTIVE TISSUE: ICD-10-CM

## 2024-07-23 DIAGNOSIS — M79.671 PAIN IN RIGHT FOOT: ICD-10-CM

## 2024-07-23 DIAGNOSIS — Z86.69 PERSONAL HISTORY OF OTHER DISEASES OF THE NERVOUS SYSTEM AND SENSE ORGANS: ICD-10-CM

## 2024-07-23 PROCEDURE — 73630 X-RAY EXAM OF FOOT: CPT | Mod: 26,RT

## 2024-07-23 PROCEDURE — 99284 EMERGENCY DEPT VISIT MOD MDM: CPT

## 2024-07-23 RX ADMIN — Medication 600 MILLIGRAM(S): at 13:33

## 2024-07-23 NOTE — ED PROVIDER NOTE - PHYSICAL EXAMINATION
General: Well appearing in no acute distress, alert and cooperative  Cardiac: Regular rate and regular rhythm, no murmurs. DP and PT pulses 2+ and intact in b/l LE.   MSK: +tenderness to palpation over plantar surface of foot from heel extending up to mid foot. No overlying swelling, erythema, warmth, open wounds. Compartments soft. Neurovascularly intact. No tenderness to palpation over dorsal aspect of foot or around right ankle.   Skin: Warm and dry, no rashes/abrasions/lacerations  Neuro: AO x 3, moves all extremities symmetrically, Motor strength 5/5 bilateral LE, sensation grossly intact. Normal gait.

## 2024-07-23 NOTE — ED PROVIDER NOTE - OBJECTIVE STATEMENT
49 y/o F with pmhx of sciatica, migraines who presents to the ED c/o right heel pain x 1 month. Pt reports pain with pressure and weight on the right heel. Pt has been taking tylenol with some relief in pain, however states that pain recurs. Last dose of tylenol was this morning around 0900. Pt did not see any provider regarding this pain. Pt has been wearing comfortable shoes such as sneakers and denies wearing any heels recently. This morning, pt notes that pain was worse which prompted visit to the ED. Denies any trauma/fall to the foot, fever/chills, swelling or redness over the foot, pain in the ankle, difficulty with ambulation (pt just notes pain with pressure on heel), numbness/tingling. NKDA.

## 2024-07-23 NOTE — ED PROVIDER NOTE - CLINICAL SUMMARY MEDICAL DECISION MAKING FREE TEXT BOX
49 y/o F with pmhx of sciatica, migraines who presents to the ED c/o right heel pain x 1 month. Atraumatic. Worse with ambulation and weight bearing.     Patient currently afebrile, hemodynamically stable, spO2 100%. +tenderness to plantar surface around heel and midfoot. No focal neuro deficits. Based on history and physical, dx most consistent with plantar fascitis. Will obtain XR to r/o calcaneal spurs or calcific tendonitis. XR with no acute findings. Pt with improvement in pain. ACE wrap and instructions for rice, ice, elevation and compression given. podiatry f/u and referral given. D/c home with strict return precautions.

## 2024-07-23 NOTE — ED PROVIDER NOTE - NSFOLLOWUPINSTRUCTIONS_ED_ALL_ED_FT
You were seen in the ED for foot pain. Your pain may be related to a condition called plantar fascitis.     Keep extremity elevated and wrapped.  Apply cool compresses to affected area for 15 minutes, 3-4 times per day.    Take Tylenol 650mg (Two regular strength 325 mg pills) every 4-6 hours or two extra strength 500mg tablets every 8 hours as needed for pain or fevers. Do not exceed 1000mg at one time or 4000mg in a 24 hour period. Take Motrin (also sold as Advil or Ibuprofen) 400-600 mg (two or three 200 mg over the counter pills) every 8 hours as needed for moderate pain or fevers-- take with food; do not take for more than 5 consecutive days.    Please follow-up with a podiatrist for further evaluation.     Return to the ED for any new or worsening symptoms.

## 2024-07-23 NOTE — ED PROVIDER NOTE - CARE PROVIDERS DIRECT ADDRESSES
[email or web address]

## 2024-07-23 NOTE — ED PROVIDER NOTE - IV ALTEPLASE INCLUSION HIDDEN
[facility])   Vascular Surgery Followup    Referring Provider:  JURGEN Garcia CNP     Chief Complaint   Patient presents with    Post-Op Check        History of Present Illness:   60 yo female here today for her first postoperative visit following right brachiocephalic fistula on March 28, 2019. Agnes Anderson She has no complaints today. Past Medical History:   has a past medical history of Abdominal pain, Acute on chronic congestive heart failure ([address] Asthma, Cervical cancer ([address] Chest pain, CHF (congestive heart failure) ([address] Chronic kidney disease, stage IV (severe) ([facility]), Chronic pain syndrome, Chronic respiratory failure with hypoxia ([address] CKD (chronic kidney disease) stage 4, GFR 15-29 ml/min ([facility]), Colon polyps, COPD (chronic obstructive pulmonary disease) ([address] Degenerative disc disease at L5-S1 level, Diabetes mellitus, insulin dependent (IDDM), uncontrolled ([address] Diabetic polyneuropathy associated with type 2 diabetes mellitus ([address] Elevated troponin, ESRD (end stage renal disease) on dialysis ([address] GERD (gastroesophageal reflux disease), Gout, History of blood transfusion, History of cervical cancer, Hyperlipidemia, Hypertension, Incarcerated ventral hernia, LVH (left ventricular hypertrophy), Morbid obesity ([address] Mucus plugging of bronchi, Obstructive sleep apnea on CPAP, Pneumonia, Psychiatric problem, Pulmonary embolism ([address] Type 2 diabetes mellitus with diabetic neuropathy, with long-term current use of insulin ([address] Urinary incontinence in female, and Venous stasis of lower extremity. Surgical History:   has a past surgical history that includes jennifer and bso (cervix removed) (10/96); Knee arthroscopy (Right, 1999); doppler echocardiography (2/21/09); Colonoscopy (2010); Upper gastrointestinal endoscopy (6/25/13); Colonoscopy (6/25/13, 11/14); Cardiac catheterization (1/14);  Hysterectomy; pr open skull supratent explore; ventral hernia repair (12/24/2016); other surgical history 667 MG capsule Take 2 capsules by mouth 3 times daily (with meals) 60 capsule 3    nebivolol (BYSTOLIC) 2.5 MG tablet Take 1 tablet by mouth 2 times daily 30 tablet 3    insulin detemir (LEVEMIR FLEXTOUCH) 100 UNIT/ML injection pen Inject 23 Units into the skin 2 times daily (Patient taking differently: Inject 30 Units into the skin 2 times daily ) 75 mL 5    docusate sodium (COLACE) 100 MG capsule Take 1 capsule by mouth 2 times daily (Patient taking differently: Take 100 mg by mouth 2 times daily as needed ) 60 capsule 0    albuterol sulfate HFA (PROAIR HFA) 108 (90 Base) MCG/ACT inhaler Inhale 2 puffs into the lungs every 6 hours as needed for Wheezing 1 Inhaler 11    fluticasone-salmeterol (ADVAIR HFA) 230-21 MCG/ACT inhaler Inhale 1 puff into the lungs 2 times daily 1 Inhaler 11    ipratropium-albuterol (DUONEB) 0.5-2.5 (3) MG/3ML SOLN nebulizer solution Inhale 3 mLs into the lungs every 4 hours DX:COPD J44.9 (Patient taking differently: Inhale 1 vial into the lungs every 4 hours as needed DX:COPD J44.9) 399 mL 11    TRULICITY 9.88 DO/4.2JY SOPN INJECT ONE  SYRINGE SUBCUTANEOUSLY ONCE A WEEK 15 pen 3    omeprazole (PRILOSEC) 40 MG delayed release capsule TAKE 1 CAPSULE BY MOUTH  DAILY 90 capsule 3    RELION PEN NEEDLE 31G/8MM 31G X 8 MM MISC USE  THREE TIMES DAILY AS DIRECTED 100 each 5    nitroGLYCERIN (NITROSTAT) 0.4 MG SL tablet DISSOLVE 1 TABLET UNDER THE TONGUE EVERY 5 MINUTES AS  NEEDED ; MAXIMUM OF 3  TABLETS IN 15 MINUTES 75 tablet 1    warfarin (COUMADIN) 7.5 MG tablet Continue f/up with coumadin clinic (Patient taking differently: 5 mg daily Patient taking 4mg on non dialysis days, 5 mg on dialysis days (Mon, Weds, Fri). Told to hold 5 days before surgery by Dr Kassie Prather' office.   Per patient coumadin is sometimes held by dialysis when levels high.) 30 tablet 3    ONE TOUCH ULTRA TEST strip USE TO TEST 3-4 TIMES DAILY DUE TO FLUCTUATING SUGAR 100 strip 5    ASSURE COMFORT LANCETS 30G MIS show

## 2024-07-23 NOTE — ED ADULT NURSE NOTE - NSFALLUNIVINTERV_ED_ALL_ED
Bed/Stretcher in lowest position, wheels locked, appropriate side rails in place/Call bell, personal items and telephone in reach/Instruct patient to call for assistance before getting out of bed/chair/stretcher/Non-slip footwear applied when patient is off stretcher/[place] to call system/Physically safe environment - no spills, clutter or unnecessary equipment/Purposeful proactive rounding/Room/bathroom lighting operational, light cord in reach

## 2024-07-23 NOTE — ED ADULT NURSE NOTE - OBJECTIVE STATEMENT
48 year old female complaining of right heal pain. Pain has been on and off for the past few months. Worsening over the past few days. Pain is worsened by being barefoot and relieved with rest. Denies any trauma or injury. Able to ambulate.

## 2024-07-23 NOTE — ED PROVIDER NOTE - PATIENT PORTAL LINK FT
You can access the FollowMyHealth Patient Portal offered by [facility] by registering at the following website: [email or web address]. By joining [employer]’s FollowMyHealth portal, you will also be able to view your health information using other applications (apps) compatible with our system.

## 2025-04-16 ENCOUNTER — EMERGENCY (EMERGENCY)
Age: 50
LOS: 1 days | End: 2025-04-16
Attending: STUDENT IN AN ORGANIZED HEALTH CARE EDUCATION/TRAINING PROGRAM | Admitting: EMERGENCY MEDICINE
Payer: MEDICAID

## 2025-04-16 VITALS
DIASTOLIC BLOOD PRESSURE: 74 MMHG | TEMPERATURE: 97 F | RESPIRATION RATE: 18 BRPM | HEART RATE: 90 BPM | WEIGHT: 164.91 LBS | OXYGEN SATURATION: 98 % | SYSTOLIC BLOOD PRESSURE: 111 MMHG

## 2025-04-16 DIAGNOSIS — M25.521 PAIN IN RIGHT ELBOW: ICD-10-CM

## 2025-04-16 DIAGNOSIS — M25.532 PAIN IN LEFT WRIST: ICD-10-CM
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
## 2025-04-16 PROBLEM — G43.909 MIGRAINE, UNSPECIFIED, NOT INTRACTABLE, WITHOUT STATUS MIGRAINOSUS: Chronic | Status: ACTIVE | Noted: 2024-07-25

## 2025-04-16 PROCEDURE — 99284 EMERGENCY DEPT VISIT MOD MDM: CPT

## 2025-04-16 PROCEDURE — 73080 X-RAY EXAM OF ELBOW: CPT | Mod: 26,RT

## 2025-04-16 PROCEDURE — 73130 X-RAY EXAM OF HAND: CPT | Mod: 26,LT

## 2025-04-16 RX ORDER — ACETAMINOPHEN 500 MG/5ML
650 LIQUID (ML) ORAL ONCE
Refills: 0 | Status: COMPLETED | OUTPATIENT
Start: 2025-04-16 | End: 2025-04-16

## 2025-07-23 ENCOUNTER — EMERGENCY (EMERGENCY)
Facility: HOSPITAL | Age: 50
LOS: 1 days | End: 2025-07-23
Admitting: EMERGENCY MEDICINE
Payer: MEDICAID

## 2025-07-23 VITALS
OXYGEN SATURATION: 95 % | HEART RATE: 86 BPM | RESPIRATION RATE: 18 BRPM | DIASTOLIC BLOOD PRESSURE: 79 MMHG | SYSTOLIC BLOOD PRESSURE: 135 MMHG | WEIGHT: 164.91 LBS | TEMPERATURE: 98 F
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
## 2025-07-23 DIAGNOSIS — Z23 ENCOUNTER FOR IMMUNIZATION: ICD-10-CM

## 2025-07-23 DIAGNOSIS — S80.212A ABRASION, LEFT KNEE, INITIAL ENCOUNTER: ICD-10-CM
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
## 2025-07-23 PROCEDURE — 99284 EMERGENCY DEPT VISIT MOD MDM: CPT

## 2025-07-23 RX ORDER — IBUPROFEN 200 MG
600 TABLET ORAL ONCE
Refills: 0 | Status: COMPLETED | OUTPATIENT
Start: 2025-07-23 | End: 2025-07-23

## 2025-07-23 RX ADMIN — Medication 600 MILLIGRAM(S): at 11:51

## 2025-07-23 NOTE — ED ADULT NURSE NOTE - HOW OFTEN DO YOU HAVE A DRINK CONTAINING ALCOHOL?
Never Bactrim Pregnancy And Lactation Text: This medication is Pregnancy Category D and is known to cause fetal risk.  It is also excreted in breast milk.

## 2025-07-23 NOTE — ED ADULT NURSE NOTE - OBJECTIVE STATEMENT
Pt is a 49y female c/o laceration to front of L leg from a old metal bar falling onto leg. Tdap not UTD. Denies AC use

## 2025-07-23 NOTE — ED ADULT NURSE NOTE - PRO INTERPRETER NEED 2
----- Message from JOELLEN Gottlieb sent at 2/2/2017  2:38 PM CST -----  CRP is elevated but stable.  CMP reveals elevated AST and ALT improved compared to previously (likely due to fatty liver).  All other labs are normal.    She should continue plan of care as discussed.  I would also encourage her to call Dr. Ball given her recurrent abdominal pain.   English

## 2025-07-23 NOTE — ED PROVIDER NOTE - NSFOLLOWUPINSTRUCTIONS_ED_ALL_ED_FT
Follow-up with your PCP as needed.    Abrasion  An abrasion is a cut or a scrape on the surface of the skin. An abrasion does not go through all the layers of the skin. It is important to care for an abrasion properly to prevent infection.    What are the causes?  This condition is caused by rubbing your skin on something or falling on a surface, such as the ground. When your skin rubs on something, some layers of skin may rub off.    What are the signs or symptoms?  The main symptom of this condition is a cut or a scrape. The cut or scrape may be bleeding, or it may appear red or pink. If your abrasion was caused by a fall, there may be a bruise under your cut or scrape.    How is this diagnosed?  An abrasion is diagnosed with a physical exam.    How is this treated?  Treatment for this condition depends on how large and deep the abrasion is. In most cases:  Your abrasion will be cleaned with water and mild soap. This is done to remove any dirt or debris (such as tiny bits of glass or rock) that may be stuck in your wound.  An antibiotic ointment may be applied to your abrasion to help prevent infection.  A bandage (dressing) may be placed on your abrasion to keep it clean.  You may also need a tetanus shot.    Follow these instructions at home:  Medicines    Take or apply over-the-counter and prescription medicines only as told by your health care provider.  If you were prescribed an antibiotic medicine, use it as told by your health care provider. Do not stop using the antibiotic even if you start to feel better.  Wound care    Clean your wound 1 or 2 times a day, or as told by your health care provider. To do this:  Wash your hands for at least 20 seconds with mild soap and water. Do this before and after you clean your wound.  Wash your wound with mild soap and water and then rinse off the soap.  Pat your wound dry with a clean towel. Do not rub your wound.  Keep your dressing clean and dry as told by your health care provider.  There are many different ways to close and cover a wound. Follow instructions from your health care provider about caring for your wound and about changing and removing your dressing. You may have to change your dressing one or more times a day, or as directed by your health care provider.  Check your wound every day for signs of infection. Check for:  Redness, especially a red streak that spreads out from your wound.  Swelling or increased pain.  Warmth.  Blood, fluid, pus, or a bad smell.  Managing pain and swelling      If directed, put ice on the injured area. To do this:  Put ice in a plastic bag.  Place a towel between your skin and the bag.  Leave the ice on for 20 minutes, 2–3 times a day.  Remove the ice if your skin turns bright red. This is very important. If you cannot feel pain, heat, or cold, you have a greater risk of damage to the area.  If possible, raise (elevate) the injured area above the level of your heart while you are sitting or lying down.  General instructions    Do not take baths, swim, or use a hot tub until your health care provider approves. Ask your doctor about taking showers or sponge baths.  Keep all follow-up visits. This is important.  Contact a health care provider if:  You received a tetanus shot, and you have swelling, severe pain, redness, or bleeding at your injection site.  Your pain is not controlled with medicine.  You have a fever.  You have any of these signs of infection:  Redness, swelling, or more pain around your wound.  Warmth coming from your wound.  Blood, fluid, pus, or a bad smell coming from your wound.  Get help right away if:  You have a red streak spreading away from your wound.  Summary  An abrasion is a cut or a scrape on the surface of the skin. Care for your abrasion properly to prevent infection.  Clean your wound with mild soap and water 1 or 2 times a day or as often as told. Follow instructions from your health care provider about taking medicines and changing your bandage (dressing).  Contact your health care provider if you have a fever or if you have redness, swelling, or more pain around your wound.  Contact your health care provider if you have warmth, blood, fluid, pus, or a bad smell com

## 2025-07-23 NOTE — ED ADULT TRIAGE NOTE - CHIEF COMPLAINT QUOTE
pt c/o laceration to back of L leg from old metal bar. tdap utd. bandage in place on arrival, bleeding controlled.

## 2025-07-23 NOTE — ED PROVIDER NOTE - CLINICAL SUMMARY MEDICAL DECISION MAKING FREE TEXT BOX
49-year-old female presents with complaints of left knee abrasion.  States a metal pipe abraded the posterior lateral aspect of her left knee.  Patient reports she has had a tetanus shot in the last 10 years.  Denies other injury/trauma, other complaints.  Exam as noted.  Plan: wound care, tetanus update, dc  Wound care performed, wound cleaned with normal saline and sterile gauze, applied clean dressing

## 2025-07-23 NOTE — ED PROVIDER NOTE - HIV OFFER
Patellofemoral disorder of left knee    S/P   ,   S/P tonsillectomy  1990
Previously Declined (within the last year)

## 2025-07-23 NOTE — ED PROVIDER NOTE - PATIENT PORTAL LINK FT
You can access the FollowMyHealth Patient Portal offered by  by registering at the following website: [email or web address]. By joining [employer]’s FollowMyHealth portal, you will also be able to view your health information using other applications (apps) compatible with our system.

## 2025-07-23 NOTE — ED PROVIDER NOTE - OBJECTIVE STATEMENT
49-year-old female presents with complaints of left knee abrasion.  States a metal pipe abraded the posterior lateral aspect of her left knee.  Patient reports she has had a tetanus shot in the last 10 years.  Denies other injury/trauma, other complaints.

## 2025-07-23 NOTE — ED PROVIDER NOTE - PHYSICAL EXAMINATION
Vital Signs: I have reviewed the initial vital signs.  Constitutional: appears stated age, no acute distress  Eyes: Sclera clear, EOMI.  Cardiovascular: S1 and S2, regular rate, regular rhythm, well-perfused extremities, radial pulses equal and 2+, pedal pulses 2+ and equal  Respiratory: unlabored respiratory effort  Musculoskeletal: supple neck, no lower extremity edema  Integumentary: warm, dry, left knee superficial abrasion  Neurologic: awake, alert, oriented x3, extremities’ motor and sensory functions grossly intact